# Patient Record
(demographics unavailable — no encounter records)

---

## 2024-12-16 NOTE — HISTORY OF PRESENT ILLNESS
[FreeTextEntry8] : pt c/o ha x 2 months that he admitted to prior 1 yr ago relieved mildly with aspirin. Admits to pain in neck and is under stress. Richard changes in vision, watery eyes.

## 2024-12-16 NOTE — PHYSICAL EXAM
[No JVD] : no jugular venous distention [No Respiratory Distress] : no respiratory distress  [No Accessory Muscle Use] : no accessory muscle use [Clear to Auscultation] : lungs were clear to auscultation bilaterally [Normal Rate] : normal rate  [Regular Rhythm] : with a regular rhythm [Normal S1, S2] : normal S1 and S2 [No Extremity Clubbing/Cyanosis] : no extremity clubbing/cyanosis [No Spinal Tenderness] : no spinal tenderness [Alert and Oriented x3] : oriented to person, place, and time [de-identified] : decreased ROM c spine

## 2024-12-16 NOTE — PLAN
[FreeTextEntry1] : reviewed prior MRI brain  start muscle relaxer, heating and lidocaine patch  suggested PT  smoking cessation  RTO if not improved 1 month

## 2024-12-16 NOTE — REVIEW OF SYSTEMS
[Joint Pain] : joint pain [Joint Stiffness] : no joint stiffness [Muscle Pain] : muscle pain [Back Pain] : no back pain [Joint Swelling] : no joint swelling [Headache] : headache [Dizziness] : no dizziness [Unsteady Walk] : no ataxia [Memory Loss] : no memory loss [Negative] : Heme/Lymph

## 2025-05-01 NOTE — PHYSICAL EXAM
[Bilateral] : knee bilaterally [There are no fractures, subluxations or dislocations. No significant abnormalities are seen] : There are no fractures, subluxations or dislocations. No significant abnormalities are seen [de-identified] : Constitutional: The patient appears well developed, well nourished. Examination of patients ability to communicate functionally was normal.       Neurologic: Coordination is normal. Alert and oriented to time, place and person. No evidence of mood disorder, calm affect.         RIGHT    KNEE: Inspection of the knee is as follows: mild effusion. no ecchymosis, no streaking, no erythema, no atrophy, no deformities of the quad tendon and no deformities of patellar tendon.       Palpation of the knee is as follows: medial and lateral joint line tenderness. no obvious defects, no palpable masses, no increased warmth and no crepitus.       Knee Range of Motion is as follows in degrees:       Extension: 0    Flexion: 125      Strength examination of the knee is as follows:       Quadriceps strength is 5/5   Hamstring strength is 5/5       Ligament Stability and Special Test:  positive McMurrays test. ligamentously stable, negative anterior draw, negative Lachman test, negative posterior draw and no varus or valgus instability. patella tracks well and able to do active straight leg raise without an extensor lag.       Neurological examination of the knee is as follows: light touch is intact throughout.       Gait and function is as follows: non-antalgic gait.   Constitutional: The patient appears well developed, well nourished. Examination of patients ability to communicate functionally was normal.       Neurologic: Coordination is normal. Alert and oriented to time, place and person. No evidence of mood disorder, calm affect.        LEFT     KNEE: Inspection of the knee is as follows: mild effusion. no ecchymosis, no streaking, no erythema, no atrophy, no deformities of the quad tendon and no deformities of patellar tendon.       Palpation of the knee is as follows: MILD medial joint line tenderness and PF TTP . no obvious defects, no palpable masses, no increased warmth and no crepitus.       Knee Range of Motion is as follows in degrees:       Extension: 0    Flexion: 125      Strength examination of the knee is as follows:       Quadriceps strength is 5/5   Hamstring strength is 5/5       Ligament Stability and Special Test:  positive McMurrays test. ligamentously stable, negative anterior draw, negative Lachman test, negative posterior draw and no varus or valgus instability. patella tracks well and able to do active straight leg raise without an extensor lag.       Neurological examination of the knee is as follows: light touch is intact throughout.       Gait and function is as follows: non-antalgic gait.

## 2025-05-01 NOTE — HISTORY OF PRESENT ILLNESS
[de-identified] : 05/01/2025  ROBINSON BERUMEN 56 year y/o M presents today for bilateral knee pain. Patient reports his right knee is more painful than left.  Patient has a hx of left knee scope with partial medial and lateral meniscectomy on 08/02/24 with Dr. Abdalla. He states the knee scope gave him great relief.  He notes pain medial aspect Right knee and anterior Left knee recently.  Date of Onset: About 1 month ago Mechanism of injury: NKI   Pain:    At Rest: 7/10    With Activity: 8/10   Have you been treated for this in the past? Patient was treated in the past for the left knee by Dr. Abdalla. No treatment for the right knee in the past.  OTC/Rx Medication: Patient reports taking tylenol PRN with relief.  Heat, Ice, Elevation: No Previous Imaging/Studies: No

## 2025-05-01 NOTE — DISCUSSION/SUMMARY
[de-identified] : ROBINSON BERUMEN 56 year  M was seen and evaluated in office today. Following evaluation, the natural history of the pathology was explained in full to the patient in layman's terms. At this time, they explained to the patient that based on physical exam and imaging review, patient likely has Medial/lateral meniscal tear of the knee. Discussed with patients that due to this meniscal tear, they are at increased risk of developing knee osteoarthritis. Discussed further with patient that due to the fragile blood supply of the meniscus and the nature of the condition, it is unlikely that the tear will heal on its own, and they will likely experience constant/episodic pain in the knee limiting ADLs. Discussed that due to the meniscal tear and the associated increased risk of progression to knee osteoarthritis ultimately leading to an eventual need for knee arthroplasty in the future.    In the interim, discussed with patient several different treatment options, along with specific risks and benefits of both surgical and non-surgical treatments. Nonsurgical options including but not limited to Corticosteroid Injection, Visco supplementation series, Meloxicam 15mg, Medrol Dose Pack, along with activity modification such as non-impact exercise. Risk of morbidity associated with proposed treatments were discussed. Risks include, but are not limited to, the risk associated with prescription strength medications and possible side effects of these medications which include GI hemorrhage with oral medications along with cardiac/renal issues with long term use  Furthermore, discussed with ROBINSON that they could also delay any immediate treatment options and continue to observe and self-care for the discussed problem. Discussed Home Exercise Programs as well as Rest, Ice and elevation. Patient had ample time to ask any questions about todays visit and the diagnosis, and all questions were answered. Patient understands the plan going forward.    Based on this patient's presentation at our office, which is an orthopedic specialist office, this patients condition is expected to progress. Patient, due to this condition has the risk of development and/or exacerbation of cardiovascular disease/conditions, obesity, DVT, worsening and chronic pain, and permanent loss of function, as well as decreased abilities to complete activities of daily life.  At this time, indicated patient for Meloxicam 15mg due to pain and inflammation. Patient was provided prescription for Meloxicam 15mg. There is a moderate risk of morbidity  from use of prescription strength medications. Possible side effects of these medications include upset stomach with oral medications, skin reactions to topical medications and cardiac/renal issues with long term use. I recommended that the patient follow-up with their medical physician to discuss any significant specific potential issues with long term medication use such as interactions with current medications or with exacerbation of underlying medical comorbidities. The patient voiced their understanding of the risks including but not limited to bleeding, stroke, kidney dysfunction, heart disease, and were referred to the black box warning label for further information.   Patient was given Rx for MRI of the RIGHT KNEE  to further evaluate for any ligamentous, muscle and cartilaginous injury that is suspected due to physical examination and patients description of pain, clicking, and feelings of instability and/or weakness.   Patient was instructed to f/u after imaging for review.  Entered by Meche Lockhart acting as scribe. Dr. Abdalla Attestation The documentation recorded by the scribe, in my presence, accurately reflects the service I, Dr. Abdalla, personally performed, and the decisions made by me with my edits as appropriate.

## 2025-05-15 NOTE — PHYSICAL EXAM
[de-identified] : Constitutional: The patient appears well developed, well nourished. Examination of patients ability to communicate functionally was normal.       Neurologic: Coordination is normal. Alert and oriented to time, place and person. No evidence of mood disorder, calm affect.         RIGHT    KNEE: Inspection of the knee is as follows: mild effusion. no ecchymosis, no streaking, no erythema, no atrophy, no deformities of the quad tendon and no deformities of patellar tendon.       Palpation of the knee is as follows: medial and lateral joint line tenderness. no obvious defects, no palpable masses, no increased warmth and no crepitus.       Knee Range of Motion is as follows in degrees:       Extension: 0    Flexion: 125      Strength examination of the knee is as follows:       Quadriceps strength is 5/5   Hamstring strength is 5/5       Ligament Stability and Special Test:  positive McMurrays test. ligamentously stable, negative anterior draw, negative Lachman test, negative posterior draw and no varus or valgus instability. patella tracks well and able to do active straight leg raise without an extensor lag.       Neurological examination of the knee is as follows: light touch is intact throughout.       Gait and function is as follows: non-antalgic gait.   Constitutional: The patient appears well developed, well nourished. Examination of patients ability to communicate functionally was normal.       Neurologic: Coordination is normal. Alert and oriented to time, place and person. No evidence of mood disorder, calm affect.        LEFT     KNEE: Inspection of the knee is as follows: mild effusion. no ecchymosis, no streaking, no erythema, no atrophy, no deformities of the quad tendon and no deformities of patellar tendon.       Palpation of the knee is as follows: MILD medial joint line tenderness and PF TTP . no obvious defects, no palpable masses, no increased warmth and no crepitus.       Knee Range of Motion is as follows in degrees:       Extension: 0    Flexion: 125      Strength examination of the knee is as follows:       Quadriceps strength is 5/5   Hamstring strength is 5/5       Ligament Stability and Special Test:  positive McMurrays test. ligamentously stable, negative anterior draw, negative Lachman test, negative posterior draw and no varus or valgus instability. patella tracks well and able to do active straight leg raise without an extensor lag.       Neurological examination of the knee is as follows: light touch is intact throughout.       Gait and function is as follows: non-antalgic gait.

## 2025-05-15 NOTE — HISTORY OF PRESENT ILLNESS
[de-identified] : 05/15/2025  ROBINSON BERUMEN 57 year y/o M presents today for follow up bilateral knee pain. Patient reports his right knee is more painful than left.  Treatments since last visit: Meloxicam 15mg with some relief. Tylenol PRN with some relief.  Changes Since Last Visit : R knee MRI at  5/1/2025 5/1/2025 MRI at : Impression:  1. Horizontal tear involving the body and posterior horn of the medial meniscus with parameniscal cyst  2. Horizontal tear involving the posterior horn of the lateral meniscus 3. Partial thickness tear of the proximal anterior cruciate ligament  4. Semimembranosus-gastrocnemius bursitis  05/01/2025  ROBINSON BERUMEN 56 year y/o M presents today for bilateral knee pain. Patient reports his right knee is more painful than left.  Patient has a hx of left knee scope with partial medial and lateral meniscectomy on 08/02/24 with Dr. Abdalla. He states the knee scope gave him great relief.  He notes pain medial aspect Right knee and anterior Left knee recently.  Date of Onset: About 1 month ago Mechanism of injury: NKI   Pain:    At Rest: 7/10    With Activity: 8/10   Have you been treated for this in the past? Patient was treated in the past for the left knee by Dr. Abdalla. No treatment for the right knee in the past.  OTC/Rx Medication: Patient reports taking tylenol PRN with relief.  Heat, Ice, Elevation: No Previous Imaging/Studies: No

## 2025-05-15 NOTE — DISCUSSION/SUMMARY
[de-identified] : ROBINSON BERUMEN 56 year  M was seen and evaluated in office today. Following evaluation, the natural history of the pathology was explained in full to the patient in layman's terms. At this time, they explained to the patient that based on physical exam and imaging review, patient likely has Medial/lateral meniscal tear of the knee. Discussed with patients that due to this meniscal tear, they are at increased risk of developing knee osteoarthritis. Discussed further with patient that due to the fragile blood supply of the meniscus and the nature of the condition, it is unlikely that the tear will heal on its own, and they will likely experience constant/episodic pain in the knee limiting ADLs. Discussed that due to the meniscal tear and the associated increased risk of progression to knee osteoarthritis ultimately leading to an eventual need for knee arthroplasty in the future.    In the interim, discussed with patient several different treatment options, along with specific risks and benefits of both surgical and non-surgical treatments. Nonsurgical options including but not limited to Corticosteroid Injection, Visco supplementation series, Meloxicam 15mg, Medrol Dose Pack, along with activity modification such as non-impact exercise. Risk of morbidity associated with proposed treatments were discussed. Risks include, but are not limited to, the risk associated with prescription strength medications and possible side effects of these medications which include GI hemorrhage with oral medications along with cardiac/renal issues with long term use  Furthermore, discussed with ROBINOSN that they could also delay any immediate treatment options and continue to observe and self-care for the discussed problem. Discussed Home Exercise Programs as well as Rest, Ice and elevation. Patient had ample time to ask any questions about todays visit and the diagnosis, and all questions were answered. Patient understands the plan going forward.    Based on this patient's presentation at our office, which is an orthopedic specialist office, this patients condition is expected to progress. Patient, due to this condition has the risk of development and/or exacerbation of cardiovascular disease/conditions, obesity, DVT, worsening and chronic pain, and permanent loss of function, as well as decreased abilities to complete activities of daily life.  I have personally and independently reviewed all images, records and imaging reports. The images, findings and impressions were thoroughly discussed and reviewed with the patient as well. All questions have been answered and the patient is in agreement with the plan proceeding.  At this time, indicated patient for Meloxicam 15mg due to pain and inflammation. Patient reports he has Meloxicam at home. Patient was advised to continue taking as directed.   Patient reports continued pain of the LEFT knee. Patient is s/p left knee arthroscopy 08/02/24.  Patient was given Rx for MRI of the LEFT KNEE  to further evaluate for any ligamentous, muscle and cartilaginous injury that is suspected due to physical examination and patients description of pain, clicking, and feelings of instability and/or weakness.   Patient was instructed to f/u after imaging for review.  Entered by Meche Lockhart acting as scribe. Dr. Abdalla Attestation The documentation recorded by the scribe, in my presence, accurately reflects the service I, Dr. Abdalla, personally performed, and the decisions made by me with my edits as appropriate.

## 2025-05-15 NOTE — DATA REVIEWED
[MRI] : MRI [Right] : of the right [Knee] : knee [I independently reviewed and interpreted images and report] : I independently reviewed and interpreted images and report [FreeTextEntry1] : Horizontal tear involving the posterior horn of the lateral AND medial meniscus

## 2025-06-12 NOTE — DISCUSSION/SUMMARY
[de-identified] : ROBINSON BERUMEN 56 year  M was seen and evaluated in office today. Following evaluation, the natural history of the pathology was explained in full to the patient in layman's terms. At this time, they explained to the patient that based on physical exam and imaging review, patient likely has Medial/lateral meniscal tear of the knee. Discussed with patients that due to this meniscal tear, they are at increased risk of developing knee osteoarthritis. Discussed further with patient that due to the fragile blood supply of the meniscus and the nature of the condition, it is unlikely that the tear will heal on its own, and they will likely experience constant/episodic pain in the knee limiting ADLs. Discussed that due to the meniscal tear and the associated increased risk of progression to knee osteoarthritis ultimately leading to an eventual need for knee arthroplasty in the future.    In the interim, discussed with patient several different treatment options, along with specific risks and benefits of both surgical and non-surgical treatments. Nonsurgical options including but not limited to Corticosteroid Injection, Visco supplementation series, Meloxicam 15mg, Medrol Dose Pack, along with activity modification such as non-impact exercise. Risk of morbidity associated with proposed treatments were discussed. Risks include, but are not limited to, the risk associated with prescription strength medications and possible side effects of these medications which include GI hemorrhage with oral medications along with cardiac/renal issues with long term use  Furthermore, discussed with ROBINSON that they could also delay any immediate treatment options and continue to observe and self-care for the discussed problem. Discussed Home Exercise Programs as well as Rest, Ice and elevation. Patient had ample time to ask any questions about todays visit and the diagnosis, and all questions were answered. Patient understands the plan going forward.    Based on this patient's presentation at our office, which is an orthopedic specialist office, this patients condition is expected to progress. Patient, due to this condition has the risk of development and/or exacerbation of cardiovascular disease/conditions, obesity, DVT, worsening and chronic pain, and permanent loss of function, as well as decreased abilities to complete activities of daily life.  I have personally and independently reviewed all images, records and imaging reports. The images, findings and impressions were thoroughly discussed and reviewed with the patient as well. All questions have been answered and the patient is in agreement with the plan proceeding.  At this time, indicated patient for Meloxicam 15mg due to pain and inflammation. Patient reports he has Meloxicam at home. Patient was advised to continue taking as directed.   Patient reports continued pain of the LEFT knee. Patient is s/p left knee arthroscopy 08/02/24.  Patient was given Rx for MRI of the LEFT KNEE  to further evaluate for any ligamentous, muscle and cartilaginous injury that is suspected due to physical examination and patients description of pain, clicking, and feelings of instability and/or weakness.    6/5/25: PATIENT TO HAVE B/L KNEE SCOPE FOR MEDIAL AND LATERAL MENISCECTOMIES.

## 2025-06-12 NOTE — HISTORY OF PRESENT ILLNESS
[de-identified] : 06/05/2025  ROBINSON BERUMEN 57 year y/o M presents today for follow up on bilateral knees.  Treatments since last visit: Meloxicam PRN- with no relief. MRI LEFT 5/23/25 and RIGHT KNEE ZP on 05/7/2025 Changes Since Last Visit: Patient reports his bilateral knee pain is progressing since last visit. Patient reports his pain is limiting his ADLs.   Impression LEFT KNEE: 5/23/2025 Unchanged horizontal tear of medial meniscal posterior horn Interval development of the blunting along the free edge margin of the lateral meniscal body consistent with radial tear in the absence of interval partial meniscectomy Mild DJD Mild mucoid degeneration of ACL   Impression RIGHT KNEE: 5/7/25 IMPRESSION: 1. Horizontal tear involving the body and posterior horn of the medial meniscus with parameniscal cyst (series 5, image 30). 2. Horizontal tear involving the posterior horn of the lateral meniscus (series 5, image 13). 3. Partial thickness tear of the proximal anterior cruciate ligament (series 5, image 22). 4. Semimembranosus-gastrocnemius bursitis (series 3, image 24).   05/15/2025  ROBINSON BERUMEN 57 year y/o M presents today for follow up bilateral knee pain. Patient reports his right knee is more painful than left.  Treatments since last visit: Meloxicam 15mg with some relief. Tylenol PRN with some relief.  Changes Since Last Visit : R knee MRI at  5/1/2025 5/1/2025 MRI at : Impression:  1. Horizontal tear involving the body and posterior horn of the medial meniscus with parameniscal cyst  2. Horizontal tear involving the posterior horn of the lateral meniscus 3. Partial thickness tear of the proximal anterior cruciate ligament  4. Semimembranosus-gastrocnemius bursitis  05/01/2025  ROBINSON BERUMEN 56 year y/o M presents today for bilateral knee pain. Patient reports his right knee is more painful than left.  Patient has a hx of left knee scope with partial medial and lateral meniscectomy on 08/02/24 with Dr. Abdalla. He states the knee scope gave him great relief.  He notes pain medial aspect Right knee and anterior Left knee recently.  Date of Onset: About 1 month ago Mechanism of injury: NKI   Pain:    At Rest: 7/10    With Activity: 8/10   Have you been treated for this in the past? Patient was treated in the past for the left knee by Dr. Abdalla. No treatment for the right knee in the past.  OTC/Rx Medication: Patient reports taking tylenol PRN with relief.  Heat, Ice, Elevation: No Previous Imaging/Studies: No

## 2025-06-12 NOTE — PHYSICAL EXAM
[de-identified] : Constitutional: The patient appears well developed, well nourished. Examination of patients ability to communicate functionally was normal.       Neurologic: Coordination is normal. Alert and oriented to time, place and person. No evidence of mood disorder, calm affect.         RIGHT    KNEE: Inspection of the knee is as follows: mild effusion. no ecchymosis, no streaking, no erythema, no atrophy, no deformities of the quad tendon and no deformities of patellar tendon.       Palpation of the knee is as follows: medial and lateral joint line tenderness. no obvious defects, no palpable masses, no increased warmth and no crepitus.       Knee Range of Motion is as follows in degrees:       Extension: 0    Flexion: 125      Strength examination of the knee is as follows:       Quadriceps strength is 5/5   Hamstring strength is 5/5       Ligament Stability and Special Test:  positive McMurrays test. ligamentously stable, negative anterior draw, negative Lachman test, negative posterior draw and no varus or valgus instability. patella tracks well and able to do active straight leg raise without an extensor lag.       Neurological examination of the knee is as follows: light touch is intact throughout.       Gait and function is as follows: non-antalgic gait.   Constitutional: The patient appears well developed, well nourished. Examination of patients ability to communicate functionally was normal.       Neurologic: Coordination is normal. Alert and oriented to time, place and person. No evidence of mood disorder, calm affect.        LEFT     KNEE: Inspection of the knee is as follows: mild effusion. no ecchymosis, no streaking, no erythema, no atrophy, no deformities of the quad tendon and no deformities of patellar tendon.       Palpation of the knee is as follows: MILD medial joint line tenderness and PF TTP . no obvious defects, no palpable masses, no increased warmth and no crepitus.       Knee Range of Motion is as follows in degrees:       Extension: 0    Flexion: 125      Strength examination of the knee is as follows:       Quadriceps strength is 5/5   Hamstring strength is 5/5       Ligament Stability and Special Test:  positive McMurrays test. ligamentously stable, negative anterior draw, negative Lachman test, negative posterior draw and no varus or valgus instability. patella tracks well and able to do active straight leg raise without an extensor lag.       Neurological examination of the knee is as follows: light touch is intact throughout.       Gait and function is as follows: non-antalgic gait.

## 2025-06-12 NOTE — PHYSICAL EXAM
[de-identified] : Constitutional: The patient appears well developed, well nourished. Examination of patients ability to communicate functionally was normal.       Neurologic: Coordination is normal. Alert and oriented to time, place and person. No evidence of mood disorder, calm affect.         RIGHT    KNEE: Inspection of the knee is as follows: mild effusion. no ecchymosis, no streaking, no erythema, no atrophy, no deformities of the quad tendon and no deformities of patellar tendon.       Palpation of the knee is as follows: medial and lateral joint line tenderness. no obvious defects, no palpable masses, no increased warmth and no crepitus.       Knee Range of Motion is as follows in degrees:       Extension: 0    Flexion: 125      Strength examination of the knee is as follows:       Quadriceps strength is 5/5   Hamstring strength is 5/5       Ligament Stability and Special Test:  positive McMurrays test. ligamentously stable, negative anterior draw, negative Lachman test, negative posterior draw and no varus or valgus instability. patella tracks well and able to do active straight leg raise without an extensor lag.       Neurological examination of the knee is as follows: light touch is intact throughout.       Gait and function is as follows: non-antalgic gait.   Constitutional: The patient appears well developed, well nourished. Examination of patients ability to communicate functionally was normal.       Neurologic: Coordination is normal. Alert and oriented to time, place and person. No evidence of mood disorder, calm affect.        LEFT     KNEE: Inspection of the knee is as follows: mild effusion. no ecchymosis, no streaking, no erythema, no atrophy, no deformities of the quad tendon and no deformities of patellar tendon.       Palpation of the knee is as follows: MILD medial joint line tenderness and PF TTP . no obvious defects, no palpable masses, no increased warmth and no crepitus.       Knee Range of Motion is as follows in degrees:       Extension: 0    Flexion: 125      Strength examination of the knee is as follows:       Quadriceps strength is 5/5   Hamstring strength is 5/5       Ligament Stability and Special Test:  positive McMurrays test. ligamentously stable, negative anterior draw, negative Lachman test, negative posterior draw and no varus or valgus instability. patella tracks well and able to do active straight leg raise without an extensor lag.       Neurological examination of the knee is as follows: light touch is intact throughout.       Gait and function is as follows: non-antalgic gait.

## 2025-06-12 NOTE — HISTORY OF PRESENT ILLNESS
[de-identified] : 06/05/2025  ROBINSON BERUMEN 57 year y/o M presents today for follow up on bilateral knees.  Treatments since last visit: Meloxicam PRN- with no relief. MRI LEFT 5/23/25 and RIGHT KNEE ZP on 05/7/2025 Changes Since Last Visit: Patient reports his bilateral knee pain is progressing since last visit. Patient reports his pain is limiting his ADLs.   Impression LEFT KNEE: 5/23/2025 Unchanged horizontal tear of medial meniscal posterior horn Interval development of the blunting along the free edge margin of the lateral meniscal body consistent with radial tear in the absence of interval partial meniscectomy Mild DJD Mild mucoid degeneration of ACL   Impression RIGHT KNEE: 5/7/25 IMPRESSION: 1. Horizontal tear involving the body and posterior horn of the medial meniscus with parameniscal cyst (series 5, image 30). 2. Horizontal tear involving the posterior horn of the lateral meniscus (series 5, image 13). 3. Partial thickness tear of the proximal anterior cruciate ligament (series 5, image 22). 4. Semimembranosus-gastrocnemius bursitis (series 3, image 24).   05/15/2025  ROBINSON BERUMEN 57 year y/o M presents today for follow up bilateral knee pain. Patient reports his right knee is more painful than left.  Treatments since last visit: Meloxicam 15mg with some relief. Tylenol PRN with some relief.  Changes Since Last Visit : R knee MRI at  5/1/2025 5/1/2025 MRI at : Impression:  1. Horizontal tear involving the body and posterior horn of the medial meniscus with parameniscal cyst  2. Horizontal tear involving the posterior horn of the lateral meniscus 3. Partial thickness tear of the proximal anterior cruciate ligament  4. Semimembranosus-gastrocnemius bursitis  05/01/2025  ROBINSON BERUMEN 56 year y/o M presents today for bilateral knee pain. Patient reports his right knee is more painful than left.  Patient has a hx of left knee scope with partial medial and lateral meniscectomy on 08/02/24 with Dr. Abdalla. He states the knee scope gave him great relief.  He notes pain medial aspect Right knee and anterior Left knee recently.  Date of Onset: About 1 month ago Mechanism of injury: NKI   Pain:    At Rest: 7/10    With Activity: 8/10   Have you been treated for this in the past? Patient was treated in the past for the left knee by Dr. Abdalla. No treatment for the right knee in the past.  OTC/Rx Medication: Patient reports taking tylenol PRN with relief.  Heat, Ice, Elevation: No Previous Imaging/Studies: No

## 2025-06-12 NOTE — DISCUSSION/SUMMARY
[de-identified] : ROBINSON BERUMEN 56 year  M was seen and evaluated in office today. Following evaluation, the natural history of the pathology was explained in full to the patient in layman's terms. At this time, they explained to the patient that based on physical exam and imaging review, patient likely has Medial/lateral meniscal tear of the knee. Discussed with patients that due to this meniscal tear, they are at increased risk of developing knee osteoarthritis. Discussed further with patient that due to the fragile blood supply of the meniscus and the nature of the condition, it is unlikely that the tear will heal on its own, and they will likely experience constant/episodic pain in the knee limiting ADLs. Discussed that due to the meniscal tear and the associated increased risk of progression to knee osteoarthritis ultimately leading to an eventual need for knee arthroplasty in the future.    In the interim, discussed with patient several different treatment options, along with specific risks and benefits of both surgical and non-surgical treatments. Nonsurgical options including but not limited to Corticosteroid Injection, Visco supplementation series, Meloxicam 15mg, Medrol Dose Pack, along with activity modification such as non-impact exercise. Risk of morbidity associated with proposed treatments were discussed. Risks include, but are not limited to, the risk associated with prescription strength medications and possible side effects of these medications which include GI hemorrhage with oral medications along with cardiac/renal issues with long term use  Furthermore, discussed with ROBINSON that they could also delay any immediate treatment options and continue to observe and self-care for the discussed problem. Discussed Home Exercise Programs as well as Rest, Ice and elevation. Patient had ample time to ask any questions about todays visit and the diagnosis, and all questions were answered. Patient understands the plan going forward.    Based on this patient's presentation at our office, which is an orthopedic specialist office, this patients condition is expected to progress. Patient, due to this condition has the risk of development and/or exacerbation of cardiovascular disease/conditions, obesity, DVT, worsening and chronic pain, and permanent loss of function, as well as decreased abilities to complete activities of daily life.  I have personally and independently reviewed all images, records and imaging reports. The images, findings and impressions were thoroughly discussed and reviewed with the patient as well. All questions have been answered and the patient is in agreement with the plan proceeding.  At this time, indicated patient for Meloxicam 15mg due to pain and inflammation. Patient reports he has Meloxicam at home. Patient was advised to continue taking as directed.   Patient reports continued pain of the LEFT knee. Patient is s/p left knee arthroscopy 08/02/24.  Patient was given Rx for MRI of the LEFT KNEE  to further evaluate for any ligamentous, muscle and cartilaginous injury that is suspected due to physical examination and patients description of pain, clicking, and feelings of instability and/or weakness.    6/5/25: PATIENT TO HAVE B/L KNEE SCOPE FOR MEDIAL AND LATERAL MENISCECTOMIES.